# Patient Record
Sex: FEMALE | Race: WHITE | NOT HISPANIC OR LATINO | URBAN - METROPOLITAN AREA
[De-identification: names, ages, dates, MRNs, and addresses within clinical notes are randomized per-mention and may not be internally consistent; named-entity substitution may affect disease eponyms.]

---

## 2023-08-20 PROBLEM — R53.83 OTHER FATIGUE: Status: ACTIVE | Noted: 2023-08-20

## 2023-08-20 PROBLEM — G40.909 SEIZURE DISORDER (MULTI): Status: ACTIVE | Noted: 2023-08-20

## 2023-08-20 PROBLEM — M76.60 ACHILLES TENDINITIS: Status: ACTIVE | Noted: 2023-08-20

## 2023-08-20 PROBLEM — N95.0 POSTMENOPAUSAL BLEEDING: Status: ACTIVE | Noted: 2023-08-20

## 2023-08-20 PROBLEM — E78.00 HYPERCHOLESTEROLEMIA: Status: ACTIVE | Noted: 2023-08-20

## 2023-08-20 PROBLEM — R74.8 ELEVATED LIVER ENZYMES: Status: ACTIVE | Noted: 2023-08-20

## 2023-08-20 PROBLEM — E11.9 DM (DIABETES MELLITUS) (MULTI): Status: ACTIVE | Noted: 2023-08-20

## 2023-08-20 PROBLEM — E55.9 VITAMIN D DEFICIENCY: Status: ACTIVE | Noted: 2023-08-20

## 2023-08-20 PROBLEM — I10 ESSENTIAL HYPERTENSION: Status: ACTIVE | Noted: 2023-08-20

## 2023-08-20 RX ORDER — IBUPROFEN 600 MG/1
1 TABLET ORAL
COMMUNITY
Start: 2013-10-10 | End: 2023-10-03

## 2023-08-20 RX ORDER — IBUPROFEN 400 MG/1
1 TABLET ORAL
COMMUNITY

## 2023-08-20 RX ORDER — LANCETS 26 GAUGE
EACH MISCELLANEOUS 3 TIMES DAILY
COMMUNITY
Start: 2020-12-03 | End: 2023-10-05 | Stop reason: SDUPTHER

## 2023-08-20 RX ORDER — ETHOSUXIMIDE 250 MG/1
2 CAPSULE ORAL NIGHTLY
COMMUNITY

## 2023-08-20 RX ORDER — ALBUTEROL SULFATE 90 UG/1
1 AEROSOL, METERED RESPIRATORY (INHALATION) EVERY 4 HOURS PRN
COMMUNITY
End: 2023-10-23 | Stop reason: SDUPTHER

## 2023-08-20 RX ORDER — GLIMEPIRIDE 1 MG/1
1 TABLET ORAL
COMMUNITY
Start: 2018-10-09 | End: 2024-05-28 | Stop reason: WASHOUT

## 2023-08-20 RX ORDER — LOSARTAN POTASSIUM 50 MG/1
1.5 TABLET ORAL DAILY
COMMUNITY
Start: 2012-08-14 | End: 2024-05-22

## 2023-08-20 RX ORDER — METFORMIN HYDROCHLORIDE 500 MG/1
1 TABLET, EXTENDED RELEASE ORAL 2 TIMES DAILY
COMMUNITY
Start: 2012-10-21

## 2023-08-20 RX ORDER — LOVASTATIN 20 MG/1
1 TABLET ORAL DAILY
COMMUNITY
Start: 2012-08-14 | End: 2023-10-03

## 2023-08-20 RX ORDER — LOVASTATIN 10 MG/1
1 TABLET ORAL DAILY
COMMUNITY
End: 2023-10-15 | Stop reason: SDUPTHER

## 2023-09-30 DIAGNOSIS — M54.50 LOW BACK PAIN, UNSPECIFIED: ICD-10-CM

## 2023-09-30 DIAGNOSIS — E11.9 TYPE 2 DIABETES MELLITUS WITHOUT COMPLICATIONS (MULTI): ICD-10-CM

## 2023-10-03 RX ORDER — IBUPROFEN 600 MG/1
TABLET ORAL
Qty: 90 TABLET | Refills: 0 | Status: SHIPPED | OUTPATIENT
Start: 2023-10-03 | End: 2023-12-04

## 2023-10-03 RX ORDER — LOVASTATIN 20 MG/1
20 TABLET ORAL DAILY
Qty: 90 TABLET | Refills: 0 | Status: SHIPPED | OUTPATIENT
Start: 2023-10-03 | End: 2024-02-21

## 2023-10-05 ENCOUNTER — OFFICE VISIT (OUTPATIENT)
Dept: PRIMARY CARE | Facility: CLINIC | Age: 63
End: 2023-10-05
Payer: COMMERCIAL

## 2023-10-05 VITALS
OXYGEN SATURATION: 97 % | WEIGHT: 186 LBS | BODY MASS INDEX: 32.96 KG/M2 | DIASTOLIC BLOOD PRESSURE: 82 MMHG | TEMPERATURE: 98 F | HEIGHT: 63 IN | SYSTOLIC BLOOD PRESSURE: 134 MMHG | HEART RATE: 87 BPM

## 2023-10-05 DIAGNOSIS — Z13.6 SCREENING FOR CARDIOVASCULAR CONDITION: ICD-10-CM

## 2023-10-05 DIAGNOSIS — Z53.20 COLON CANCER SCREENING DECLINED: ICD-10-CM

## 2023-10-05 DIAGNOSIS — E55.9 VITAMIN D DEFICIENCY: ICD-10-CM

## 2023-10-05 DIAGNOSIS — Z28.21 IMMUNIZATION DECLINED: ICD-10-CM

## 2023-10-05 DIAGNOSIS — G40.909 SEIZURE DISORDER (MULTI): ICD-10-CM

## 2023-10-05 DIAGNOSIS — E11.65 TYPE 2 DIABETES MELLITUS WITH HYPERGLYCEMIA, WITHOUT LONG-TERM CURRENT USE OF INSULIN (MULTI): ICD-10-CM

## 2023-10-05 DIAGNOSIS — Z00.00 ANNUAL PHYSICAL EXAM: Primary | ICD-10-CM

## 2023-10-05 PROCEDURE — 3075F SYST BP GE 130 - 139MM HG: CPT | Performed by: STUDENT IN AN ORGANIZED HEALTH CARE EDUCATION/TRAINING PROGRAM

## 2023-10-05 PROCEDURE — 3079F DIAST BP 80-89 MM HG: CPT | Performed by: STUDENT IN AN ORGANIZED HEALTH CARE EDUCATION/TRAINING PROGRAM

## 2023-10-05 PROCEDURE — 1036F TOBACCO NON-USER: CPT | Performed by: STUDENT IN AN ORGANIZED HEALTH CARE EDUCATION/TRAINING PROGRAM

## 2023-10-05 PROCEDURE — 4010F ACE/ARB THERAPY RXD/TAKEN: CPT | Performed by: STUDENT IN AN ORGANIZED HEALTH CARE EDUCATION/TRAINING PROGRAM

## 2023-10-05 PROCEDURE — 99396 PREV VISIT EST AGE 40-64: CPT | Performed by: STUDENT IN AN ORGANIZED HEALTH CARE EDUCATION/TRAINING PROGRAM

## 2023-10-05 RX ORDER — BLOOD-GLUCOSE SENSOR
EACH MISCELLANEOUS
COMMUNITY

## 2023-10-05 RX ORDER — LOVASTATIN 10 MG/1
10 TABLET ORAL NIGHTLY
COMMUNITY
End: 2023-10-15 | Stop reason: SDUPTHER

## 2023-10-05 RX ORDER — INSULIN PUMP SYRINGE, 3 ML
1 EACH MISCELLANEOUS AS NEEDED
COMMUNITY
End: 2023-10-05 | Stop reason: SDUPTHER

## 2023-10-05 ASSESSMENT — PATIENT HEALTH QUESTIONNAIRE - PHQ9
2. FEELING DOWN, DEPRESSED OR HOPELESS: NOT AT ALL
SUM OF ALL RESPONSES TO PHQ9 QUESTIONS 1 AND 2: 0
1. LITTLE INTEREST OR PLEASURE IN DOING THINGS: NOT AT ALL

## 2023-10-05 ASSESSMENT — PAIN SCALES - GENERAL: PAINLEVEL: 0-NO PAIN

## 2023-10-05 NOTE — PROGRESS NOTES
"Subjective   Shadia Orozco is a 63 y.o. female who presents for Annual Exam.    HPI:      Preventative Health Care:  No vaccinations on file  Mammogram:  ordered last visit, scheduled 11/3/2023 at [facility]  Colon Cancer Screening:  Declines  Pap:  Has GYN, prior 3 years    Review of systems is essentially negative for all systems except for any identified issues in HPI above.    Follows with endocrinology.  Not taking the glimiperide due to side effects:  dizziness/unsteadiness.  Seeing NP in December, physician in January.  Has Karis  Eye exam 1 month ago, has appointment    Labs done at [facility] in July    Ethosux level, appt with neurologist in December.        Objective     /82   Pulse 87   Temp 36.7 °C (98 °F)   Ht 1.6 m (5' 3\")   Wt 84.4 kg (186 lb)   SpO2 97%   BMI 32.95 kg/m²      DIABETIC FOOT EXAM:  DATE: 10/05/23  VISUAL EXAM PERFORMED OF FOOT?  yes  SENSORY TESTING OF FOOT PERFORMED:  yes  PEDAL PULSE TAKEN PERFORMED: 2+  RESULTS:  normal monofilament exam    DIABETIC EYE EXAM:  DATE: 10/5/2023 EXAMINATION OF RETINA  RESULTS: appt later today, no hx of DM retinopathy    PHYSICAL EXAM    GENERAL  Well-appearing, pleasant and cooperative.  No acute distress.    HEENT  HEAD:   Normocephalic.  Atraumatic.  EYES:  PERRLA.  No scleral icterus or conjunctival injection.  EARS:  Tympanic membranes visualized bilaterally without erythema, fluid, or bulging.  NECK:  No adenopathy.  No palpable thyroid enlargement or nodules.    THROAT:  Moist oropharynx without tonsillar enlargement or exudates.    LUNGS:    Clear to auscultation bilaterally.  No wheezes, rales, rhonchi.    CARDIAC:  Regular rate and rhythm.  Normal S1S2.  No murmurs/rubs/gallops.    ABDOMEN:  Soft, non-tender, non-distended.  No hepatosplenomegaly.  Normoactive bowel sounds.    MUSCULOSKELETAL:  No gross abnormalities.   No joint swelling or erythema,.  No spinal or paraspinal tenderness to palpation.    EXTREMITIES:  No LE edema or " cyanosis.      NEURO           Alert and oriented x3. No focal deficits.    PSYCH:          Affect appropriate.          Assessment/Plan   Problem List Items Addressed This Visit          Endocrine/Metabolic    DM (diabetes mellitus) (CMS/HCC)    Vitamin D deficiency    Relevant Orders    Vitamin D 25-Hydroxy,Total (for eval of Vitamin D levels) (Completed)       Neuro    Seizure disorder (CMS/HCC)    Relevant Orders    Ethosuximide (Completed)     Other Visit Diagnoses       Annual physical exam    -  Primary    Relevant Orders    Hepatitis C Antibody (Completed)    CBC (Completed)    Screening for cardiovascular condition        Colon cancer screening declined        Immunization declined                     Marci Berry MD

## 2023-10-05 NOTE — PATIENT INSTRUCTIONS
Thank you for coming to see me today.    Go to the lab for blood work, we will call you with all results.    Keep mammogram appointment as scheduled.    Follow-up with endocrinologist for your diabetes and medication concerns as discussed.    Follow-up with me in 6 months, sooner if needed.

## 2023-10-10 ENCOUNTER — LAB (OUTPATIENT)
Dept: LAB | Facility: LAB | Age: 63
End: 2023-10-10
Payer: COMMERCIAL

## 2023-10-10 DIAGNOSIS — G40.909 SEIZURE DISORDER (MULTI): ICD-10-CM

## 2023-10-10 DIAGNOSIS — E55.9 VITAMIN D DEFICIENCY: ICD-10-CM

## 2023-10-10 DIAGNOSIS — Z00.00 ANNUAL PHYSICAL EXAM: ICD-10-CM

## 2023-10-10 LAB
25(OH)D3 SERPL-MCNC: 33 NG/ML (ref 31–100)
ERYTHROCYTE [DISTWIDTH] IN BLOOD BY AUTOMATED COUNT: 12.5 % (ref 11.5–14.5)
HCT VFR BLD AUTO: 38.9 % (ref 36–46)
HCV AB SER QL: NONREACTIVE
HGB BLD-MCNC: 12.9 G/DL (ref 12–16)
MCH RBC QN AUTO: 30 PG (ref 26–34)
MCHC RBC AUTO-ENTMCNC: 33.2 G/DL (ref 32–36)
MCV RBC AUTO: 91 FL (ref 80–100)
NRBC BLD-RTO: 0 /100 WBCS (ref 0–0)
PLATELET # BLD AUTO: 241 X10*3/UL (ref 150–450)
PMV BLD AUTO: 11.6 FL (ref 7.5–11.5)
RBC # BLD AUTO: 4.3 X10*6/UL (ref 4–5.2)
WBC # BLD AUTO: 7.1 X10*3/UL (ref 4.4–11.3)

## 2023-10-10 PROCEDURE — 80168 ASSAY OF ETHOSUXIMIDE: CPT

## 2023-10-10 PROCEDURE — 85027 COMPLETE CBC AUTOMATED: CPT

## 2023-10-10 PROCEDURE — 82306 VITAMIN D 25 HYDROXY: CPT

## 2023-10-10 PROCEDURE — 36415 COLL VENOUS BLD VENIPUNCTURE: CPT

## 2023-10-10 PROCEDURE — 86803 HEPATITIS C AB TEST: CPT

## 2023-10-12 LAB — ETHOSUXIMIDE SERPL-MCNC: 40 UG/ML (ref 40–100)

## 2023-10-23 DIAGNOSIS — Z76.0 MEDICATION REFILL: ICD-10-CM

## 2023-10-23 RX ORDER — ALBUTEROL SULFATE 90 UG/1
1 AEROSOL, METERED RESPIRATORY (INHALATION) EVERY 4 HOURS PRN
Qty: 18 G | Refills: 0 | Status: SHIPPED | OUTPATIENT
Start: 2023-10-23 | End: 2023-11-21

## 2023-11-03 ENCOUNTER — HOSPITAL ENCOUNTER (OUTPATIENT)
Dept: RADIOLOGY | Facility: HOSPITAL | Age: 63
Discharge: HOME | End: 2023-11-03
Payer: COMMERCIAL

## 2023-11-03 VITALS — WEIGHT: 179 LBS | HEIGHT: 63 IN | BODY MASS INDEX: 31.71 KG/M2

## 2023-11-03 DIAGNOSIS — Z12.31 ENCOUNTER FOR SCREENING MAMMOGRAM FOR MALIGNANT NEOPLASM OF BREAST: ICD-10-CM

## 2023-11-03 PROCEDURE — 77067 SCR MAMMO BI INCL CAD: CPT

## 2023-11-07 ENCOUNTER — HOSPITAL ENCOUNTER (OUTPATIENT)
Dept: RADIOLOGY | Facility: HOSPITAL | Age: 63
Discharge: HOME | End: 2023-11-07
Payer: COMMERCIAL

## 2023-11-07 VITALS — HEIGHT: 63 IN | WEIGHT: 179 LBS | BODY MASS INDEX: 31.71 KG/M2

## 2023-11-07 DIAGNOSIS — R92.8 OTHER ABNORMAL AND INCONCLUSIVE FINDINGS ON DIAGNOSTIC IMAGING OF BREAST: ICD-10-CM

## 2023-11-07 PROCEDURE — 77065 DX MAMMO INCL CAD UNI: CPT | Mod: RT

## 2023-11-07 PROCEDURE — 76642 ULTRASOUND BREAST LIMITED: CPT | Mod: RT

## 2023-11-21 DIAGNOSIS — Z76.0 MEDICATION REFILL: ICD-10-CM

## 2023-11-21 RX ORDER — ALBUTEROL SULFATE 90 UG/1
1 AEROSOL, METERED RESPIRATORY (INHALATION) EVERY 4 HOURS PRN
Qty: 18 G | Refills: 0 | Status: SHIPPED | OUTPATIENT
Start: 2023-11-21 | End: 2024-03-28

## 2023-12-01 DIAGNOSIS — M54.50 LOW BACK PAIN, UNSPECIFIED: ICD-10-CM

## 2023-12-04 RX ORDER — IBUPROFEN 600 MG/1
TABLET ORAL
Qty: 90 TABLET | Refills: 0 | Status: SHIPPED | OUTPATIENT
Start: 2023-12-04 | End: 2024-02-23

## 2023-12-08 ENCOUNTER — TELEPHONE (OUTPATIENT)
Dept: PRIMARY CARE | Facility: CLINIC | Age: 63
End: 2023-12-08
Payer: COMMERCIAL

## 2023-12-08 DIAGNOSIS — Z12.31 ENCOUNTER FOR SCREENING MAMMOGRAM FOR MALIGNANT NEOPLASM OF BREAST: ICD-10-CM

## 2023-12-08 DIAGNOSIS — R92.8 ABNORMAL MAMMOGRAM OF RIGHT BREAST: Primary | ICD-10-CM

## 2023-12-08 DIAGNOSIS — R92.8 ABNORMAL ULTRASOUND OF BREAST: ICD-10-CM

## 2023-12-10 NOTE — TELEPHONE ENCOUNTER
Mammogram and US orders entered.  Mammogram and US last performed on 11/6/2023 and recommended six month follow up, so she is due for these studies in May, not March.

## 2023-12-11 NOTE — TELEPHONE ENCOUNTER
Call placed to patient, no answer. Left message advising the orders have been placed to repeat in May and to call back with any questions.

## 2024-02-20 DIAGNOSIS — E11.9 TYPE 2 DIABETES MELLITUS WITHOUT COMPLICATIONS (MULTI): ICD-10-CM

## 2024-02-21 RX ORDER — LOVASTATIN 10 MG/1
10 TABLET ORAL
Qty: 90 TABLET | Refills: 2 | Status: SHIPPED | OUTPATIENT
Start: 2024-02-21

## 2024-02-21 RX ORDER — LOVASTATIN 20 MG/1
20 TABLET ORAL DAILY
Qty: 90 TABLET | Refills: 0 | Status: SHIPPED | OUTPATIENT
Start: 2024-02-21 | End: 2024-05-20

## 2024-02-23 DIAGNOSIS — M54.50 LOW BACK PAIN, UNSPECIFIED: ICD-10-CM

## 2024-02-23 RX ORDER — IBUPROFEN 600 MG/1
TABLET ORAL
Qty: 90 TABLET | Refills: 0 | Status: SHIPPED | OUTPATIENT
Start: 2024-02-23 | End: 2024-03-25

## 2024-03-23 DIAGNOSIS — M54.50 LOW BACK PAIN, UNSPECIFIED: ICD-10-CM

## 2024-03-25 DIAGNOSIS — Z76.0 MEDICATION REFILL: ICD-10-CM

## 2024-03-25 RX ORDER — IBUPROFEN 600 MG/1
TABLET ORAL
Qty: 90 TABLET | Refills: 0 | Status: SHIPPED | OUTPATIENT
Start: 2024-03-25 | End: 2024-04-22

## 2024-03-28 RX ORDER — ALBUTEROL SULFATE 90 UG/1
1 AEROSOL, METERED RESPIRATORY (INHALATION) EVERY 4 HOURS PRN
Qty: 8.5 G | Refills: 3 | Status: SHIPPED | OUTPATIENT
Start: 2024-03-28

## 2024-04-21 DIAGNOSIS — M54.50 LOW BACK PAIN, UNSPECIFIED: ICD-10-CM

## 2024-04-22 RX ORDER — IBUPROFEN 600 MG/1
TABLET ORAL
Qty: 90 TABLET | Refills: 0 | Status: SHIPPED | OUTPATIENT
Start: 2024-04-22 | End: 2024-05-24

## 2024-05-02 ENCOUNTER — HOSPITAL ENCOUNTER (OUTPATIENT)
Dept: RADIOLOGY | Facility: HOSPITAL | Age: 64
Discharge: HOME | End: 2024-05-02
Payer: COMMERCIAL

## 2024-05-02 VITALS — HEIGHT: 63 IN | WEIGHT: 180 LBS | BODY MASS INDEX: 31.89 KG/M2

## 2024-05-02 DIAGNOSIS — R92.8 ABNORMAL ULTRASOUND OF BREAST: ICD-10-CM

## 2024-05-02 DIAGNOSIS — Z12.31 ENCOUNTER FOR SCREENING MAMMOGRAM FOR MALIGNANT NEOPLASM OF BREAST: ICD-10-CM

## 2024-05-02 DIAGNOSIS — R92.8 ABNORMAL MAMMOGRAM OF RIGHT BREAST: ICD-10-CM

## 2024-05-02 PROCEDURE — 77061 BREAST TOMOSYNTHESIS UNI: CPT | Mod: RIGHT SIDE | Performed by: RADIOLOGY

## 2024-05-02 PROCEDURE — 76642 ULTRASOUND BREAST LIMITED: CPT | Mod: RIGHT SIDE | Performed by: RADIOLOGY

## 2024-05-02 PROCEDURE — 76642 ULTRASOUND BREAST LIMITED: CPT | Mod: RT

## 2024-05-02 PROCEDURE — 77065 DX MAMMO INCL CAD UNI: CPT | Mod: RIGHT SIDE | Performed by: RADIOLOGY

## 2024-05-02 PROCEDURE — 77061 BREAST TOMOSYNTHESIS UNI: CPT | Mod: RT

## 2024-05-19 DIAGNOSIS — E11.9 TYPE 2 DIABETES MELLITUS WITHOUT COMPLICATIONS (MULTI): ICD-10-CM

## 2024-05-20 RX ORDER — LOVASTATIN 20 MG/1
20 TABLET ORAL DAILY
Qty: 90 TABLET | Refills: 0 | Status: SHIPPED | OUTPATIENT
Start: 2024-05-20

## 2024-05-22 DIAGNOSIS — E11.9 TYPE 2 DIABETES MELLITUS WITHOUT COMPLICATIONS (MULTI): ICD-10-CM

## 2024-05-22 RX ORDER — LOSARTAN POTASSIUM 50 MG/1
75 TABLET ORAL DAILY
Qty: 135 TABLET | Refills: 0 | Status: SHIPPED | OUTPATIENT
Start: 2024-05-22

## 2024-05-22 NOTE — TELEPHONE ENCOUNTER
Recent Visits  Date Type Provider Dept   10/05/23 Office Visit Marci Berry MD [place] [id number]   Showing recent visits within past 365 days and meeting all other requirements  Future Appointments  No visits were found meeting these conditions.  Showing future appointments within next 90 days and meeting all other requirements

## 2024-05-23 DIAGNOSIS — M54.50 LOW BACK PAIN, UNSPECIFIED: ICD-10-CM

## 2024-05-23 NOTE — TELEPHONE ENCOUNTER
Recent Visits  Date Type Provider Dept   10/05/23 Office Visit Marci Berry MD [place] [id number]   Showing recent visits within past 365 days and meeting all other requirements  Future Appointments  No visits were found meeting these conditions.  Showing future appointments within next 90 days and meeting all other requirements

## 2024-05-24 RX ORDER — IBUPROFEN 600 MG/1
TABLET ORAL
Qty: 90 TABLET | Refills: 0 | Status: SHIPPED | OUTPATIENT
Start: 2024-05-24

## 2024-05-28 ENCOUNTER — HOSPITAL ENCOUNTER (OUTPATIENT)
Dept: RADIOLOGY | Facility: CLINIC | Age: 64
Discharge: HOME | End: 2024-05-28
Payer: COMMERCIAL

## 2024-05-28 ENCOUNTER — TELEMEDICINE (OUTPATIENT)
Dept: PRIMARY CARE | Facility: CLINIC | Age: 64
End: 2024-05-28
Payer: COMMERCIAL

## 2024-05-28 ENCOUNTER — TELEPHONE (OUTPATIENT)
Dept: PRIMARY CARE | Facility: CLINIC | Age: 64
End: 2024-05-28

## 2024-05-28 VITALS — SYSTOLIC BLOOD PRESSURE: 119 MMHG | DIASTOLIC BLOOD PRESSURE: 69 MMHG | TEMPERATURE: 97.7 F

## 2024-05-28 DIAGNOSIS — R05.1 ACUTE COUGH: ICD-10-CM

## 2024-05-28 DIAGNOSIS — R05.1 ACUTE COUGH: Primary | ICD-10-CM

## 2024-05-28 DIAGNOSIS — Z91.09 ENVIRONMENTAL ALLERGIES: ICD-10-CM

## 2024-05-28 PROCEDURE — 3074F SYST BP LT 130 MM HG: CPT | Performed by: STUDENT IN AN ORGANIZED HEALTH CARE EDUCATION/TRAINING PROGRAM

## 2024-05-28 PROCEDURE — 71046 X-RAY EXAM CHEST 2 VIEWS: CPT | Performed by: RADIOLOGY

## 2024-05-28 PROCEDURE — 71046 X-RAY EXAM CHEST 2 VIEWS: CPT

## 2024-05-28 PROCEDURE — 99442 PR PHYS/QHP TELEPHONE EVALUATION 11-20 MIN: CPT | Performed by: STUDENT IN AN ORGANIZED HEALTH CARE EDUCATION/TRAINING PROGRAM

## 2024-05-28 PROCEDURE — 4010F ACE/ARB THERAPY RXD/TAKEN: CPT | Performed by: STUDENT IN AN ORGANIZED HEALTH CARE EDUCATION/TRAINING PROGRAM

## 2024-05-28 PROCEDURE — 3078F DIAST BP <80 MM HG: CPT | Performed by: STUDENT IN AN ORGANIZED HEALTH CARE EDUCATION/TRAINING PROGRAM

## 2024-05-28 PROCEDURE — 1036F TOBACCO NON-USER: CPT | Performed by: STUDENT IN AN ORGANIZED HEALTH CARE EDUCATION/TRAINING PROGRAM

## 2024-05-28 RX ORDER — BENZONATATE 100 MG/1
100 CAPSULE ORAL 3 TIMES DAILY PRN
Qty: 42 CAPSULE | Refills: 0 | Status: SHIPPED | OUTPATIENT
Start: 2024-05-28 | End: 2024-06-27

## 2024-05-28 RX ORDER — CETIRIZINE HYDROCHLORIDE 10 MG/1
10 TABLET ORAL DAILY
Qty: 30 TABLET | Refills: 1 | Status: SHIPPED | OUTPATIENT
Start: 2024-05-28 | End: 2024-08-26

## 2024-05-28 RX ORDER — AMOXICILLIN AND CLAVULANATE POTASSIUM 875; 125 MG/1; MG/1
875 TABLET, FILM COATED ORAL 2 TIMES DAILY
Qty: 20 TABLET | Refills: 0 | Status: SHIPPED | OUTPATIENT
Start: 2024-05-28 | End: 2024-06-07

## 2024-05-28 NOTE — TELEPHONE ENCOUNTER
Xray results are not yet available.  We will call her with results and futher advice once results are in.

## 2024-05-28 NOTE — TELEPHONE ENCOUNTER
Pt lvm stating she was seen in the office today and had a chest xray. Pt is requesting the results stating she would like to know if she has pneumonia if so she will need to be off from work.

## 2024-05-28 NOTE — PROGRESS NOTES
Outpatient Visit Note    Chief Complaint   Patient presents with    sick       With patient's permission, this is a Telemedicine visit with AUDIO ONLY. The provider and patient participated in this telemedicine encounter.    HPI:  Shadia Orozco is a 64 y.o. female here  for persistent cough sy    One week hx of productive cough with yellow sputum.  Sounds like rice krispy treats. Seen 2024 ago for sx at  and prescribed doxycyline, last dose today without significant symptom improvement     Coughing to the point of vomiting.  No SOB at rest, no chest pain.  No xray done during UC visit          Current Medications  Current Outpatient Medications   Medication Instructions    albuterol 90 mcg/actuation inhaler 1 puff, inhalation, Every 4 hours PRN    blood-glucose sensor (FreeStyle Karis 3 Sensor) device miscellaneous    calcium polycarbophiL (FiberCon) 625 mg tablet 2 tablets, oral, Daily PRN    CHOLECALCIFEROL, VITAMIN D3, ORAL 1 capsule, oral, Daily    ethosuximide (Zarontin) 250 mg capsule 2 capsules, oral, Nightly    glimepiride (Amaryl) 1 mg tablet 1 tablet, oral, With breakfast and dinner    ibuprofen 400 mg tablet 1 tablet, oral, 3 times daily (morning, midday, late afternoon), Or milk    ibuprofen 600 mg tablet TAKE 1 TABLET BY MOUTH THREE TIMES A DAY WITH FOOD OR MILK AS NEEDED FOR 30 DAYS    loratadine (CLARITIN ORAL) 1 tablet, oral, Daily    losartan (COZAAR) 75 mg, oral, Daily    lovastatin (MEVACOR) 10 mg, oral, Daily with evening meal    lovastatin (MEVACOR) 20 mg, oral, Daily, take with food    metFORMIN XR (Glucophage-XR) 500 mg 24 hr tablet 1 tablet, oral, 2 times daily        Allergies  Allergies   Allergen Reactions    Aspirin Other    Fentanyl Other    Nateglinide Other    Phenobarbital Unknown        Past Medical History:   Diagnosis Date    Unspecified convulsions (Multi)     Seizure      Past Surgical History:   Procedure Laterality Date    BREAST BIOPSY       SECTION,  CLASSIC  04/15/2013     Section    OTHER SURGICAL HISTORY  04/15/2013    Correction Of Surgically Induced Astigmatism     Family History   Problem Relation Name Age of Onset    Diabetes Mother      Arthritis Mother      Tremor Mother      Seizures Mother      Stroke Mother      Other (Other) Mother          Aphasia    Heart disease Father          Triple by-pass    Hypertension Father      Ulcerative colitis Father      Skin cancer Father      Heart disease Sister      No Known Problems Daughter      No Known Problems Daughter      No Known Problems Son      Stroke Maternal Grandmother      Esophageal cancer Maternal Grandfather      Liver cancer Paternal Grandfather      Breast cancer Other      Hypertension Sibling      Asthma Sibling      Anxiety disorder Sibling      Breast cancer Cousin      Breast cancer Cousin       Social History     Tobacco Use    Smoking status: Never    Smokeless tobacco: Never   Vaping Use    Vaping status: Never Used   Substance Use Topics    Alcohol use: Never    Drug use: Never     Tobacco Use: Low Risk  (2024)    Received from [facility]    Patient History     Smoking Tobacco Use: Never     Smokeless Tobacco Use: Never     Passive Exposure: Not on file        ROS  All pertinent positive symptoms are included in the history of present illness.  All other systems have been reviewed and are negative and noncontributory to this patient's current ailments.    VITAL SIGNS  Vitals:    24 0926   BP: 119/69   Temp: 36.5 °C (97.7 °F)     There were no vitals filed for this visit.   There is no height or weight on file to calculate BMI.   Patient is unable to proivide    PHYSICAL EXAM  GENERAL :  Alert and oriented x 3, Pleasant and cooperative, No acute distress.   LUNGS:  No conversational dyspnea or cough during encounter.   PSYCH:  appropriate mood and affect, no difficulty with speech.   Telemedicine visit, no other exam component done.      Assessment/Plan    Problem List Items Addressed This Visit    None  Visit Diagnoses         Codes    Acute cough    -  Primary R05.1    Relevant Medications    amoxicillin-pot clavulanate (Augmentin) 875-125 mg tablet    benzonatate (Tessalon) 100 mg capsule    Other Relevant Orders    XR chest 2 views    Environmental allergies     Z91.09    Relevant Medications    cetirizine (ZyrTEC) 10 mg tablet          Marci Berry MD   05/28/24   9:37 AM

## 2024-05-28 NOTE — PROGRESS NOTES
Crackling sound in chest when laying down, coughing yellow, been going on for 7 days, congested. Pt completed doxycycline this morning

## 2024-06-19 DIAGNOSIS — Z91.09 ENVIRONMENTAL ALLERGIES: ICD-10-CM

## 2024-06-19 RX ORDER — CETIRIZINE HYDROCHLORIDE 10 MG/1
10 TABLET ORAL DAILY
Qty: 90 TABLET | Refills: 1 | Status: SHIPPED | OUTPATIENT
Start: 2024-06-19

## 2024-09-14 DIAGNOSIS — E11.9 TYPE 2 DIABETES MELLITUS WITHOUT COMPLICATIONS (MULTI): ICD-10-CM

## 2024-09-14 DIAGNOSIS — Z76.0 MEDICATION REFILL: ICD-10-CM

## 2024-09-16 RX ORDER — LOSARTAN POTASSIUM 50 MG/1
75 TABLET ORAL DAILY
Qty: 135 TABLET | Refills: 0 | Status: SHIPPED | OUTPATIENT
Start: 2024-09-16

## 2024-10-02 DIAGNOSIS — E11.65 TYPE 2 DIABETES MELLITUS WITH HYPERGLYCEMIA, WITHOUT LONG-TERM CURRENT USE OF INSULIN: ICD-10-CM

## 2024-10-02 RX ORDER — METFORMIN HYDROCHLORIDE 500 MG/1
500 TABLET, EXTENDED RELEASE ORAL 4 TIMES DAILY
Qty: 360 TABLET | Refills: 2 | Status: SHIPPED | OUTPATIENT
Start: 2024-10-02

## 2024-11-07 DIAGNOSIS — E11.9 TYPE 2 DIABETES MELLITUS WITHOUT COMPLICATIONS (MULTI): ICD-10-CM

## 2024-11-08 RX ORDER — LOVASTATIN 10 MG/1
10 TABLET ORAL
Qty: 90 TABLET | Refills: 1 | Status: SHIPPED | OUTPATIENT
Start: 2024-11-08

## 2024-11-12 DIAGNOSIS — E11.9 TYPE 2 DIABETES MELLITUS WITHOUT COMPLICATIONS (MULTI): ICD-10-CM

## 2024-11-12 RX ORDER — LOVASTATIN 20 MG/1
20 TABLET ORAL DAILY
Qty: 90 TABLET | Refills: 0 | Status: SHIPPED | OUTPATIENT
Start: 2024-11-12

## 2024-11-22 DIAGNOSIS — Z76.0 MEDICATION REFILL: ICD-10-CM

## 2024-11-22 DIAGNOSIS — E11.9 TYPE 2 DIABETES MELLITUS WITHOUT COMPLICATIONS (MULTI): ICD-10-CM

## 2024-11-22 DIAGNOSIS — I10 ESSENTIAL HYPERTENSION: ICD-10-CM

## 2024-11-25 RX ORDER — LOVASTATIN 20 MG/1
20 TABLET ORAL DAILY
Qty: 90 TABLET | Refills: 0 | Status: SHIPPED | OUTPATIENT
Start: 2024-11-25

## 2024-11-25 RX ORDER — LOSARTAN POTASSIUM 50 MG/1
75 TABLET ORAL DAILY
Qty: 135 TABLET | Refills: 0 | Status: SHIPPED | OUTPATIENT
Start: 2024-11-25

## 2024-11-29 ENCOUNTER — APPOINTMENT (OUTPATIENT)
Dept: PRIMARY CARE | Facility: CLINIC | Age: 64
End: 2024-11-29
Payer: COMMERCIAL

## 2025-02-20 DIAGNOSIS — I10 ESSENTIAL HYPERTENSION: ICD-10-CM

## 2025-02-20 RX ORDER — LOSARTAN POTASSIUM 50 MG/1
75 TABLET ORAL DAILY
Qty: 135 TABLET | Refills: 0 | Status: SHIPPED | OUTPATIENT
Start: 2025-02-20

## 2025-03-18 ENCOUNTER — OFFICE VISIT (OUTPATIENT)
Dept: PRIMARY CARE | Facility: CLINIC | Age: 65
End: 2025-03-18
Payer: COMMERCIAL

## 2025-03-18 ENCOUNTER — APPOINTMENT (OUTPATIENT)
Dept: PRIMARY CARE | Facility: CLINIC | Age: 65
End: 2025-03-18
Payer: COMMERCIAL

## 2025-03-18 VITALS
BODY MASS INDEX: 32.24 KG/M2 | TEMPERATURE: 97.3 F | DIASTOLIC BLOOD PRESSURE: 82 MMHG | SYSTOLIC BLOOD PRESSURE: 142 MMHG | WEIGHT: 182 LBS | OXYGEN SATURATION: 99 % | HEART RATE: 89 BPM

## 2025-03-18 DIAGNOSIS — Z53.20 COLON CANCER SCREENING DECLINED: ICD-10-CM

## 2025-03-18 DIAGNOSIS — Z28.21 IMMUNIZATION DECLINED: ICD-10-CM

## 2025-03-18 DIAGNOSIS — L23.0 CONTACT DERMATITIS DUE TO METALS, UNSPECIFIED CONTACT DERMATITIS TYPE: ICD-10-CM

## 2025-03-18 DIAGNOSIS — M54.42 MIDLINE LOW BACK PAIN WITH LEFT-SIDED SCIATICA, UNSPECIFIED CHRONICITY: ICD-10-CM

## 2025-03-18 DIAGNOSIS — Z13.6 SCREENING FOR CARDIOVASCULAR CONDITION: ICD-10-CM

## 2025-03-18 DIAGNOSIS — Z00.00 ANNUAL PHYSICAL EXAM: Primary | ICD-10-CM

## 2025-03-18 DIAGNOSIS — Z12.31 ENCOUNTER FOR SCREENING MAMMOGRAM FOR MALIGNANT NEOPLASM OF BREAST: ICD-10-CM

## 2025-03-18 DIAGNOSIS — E11.65 TYPE 2 DIABETES MELLITUS WITH HYPERGLYCEMIA, WITHOUT LONG-TERM CURRENT USE OF INSULIN: ICD-10-CM

## 2025-03-18 DIAGNOSIS — R06.2 WHEEZING: ICD-10-CM

## 2025-03-18 DIAGNOSIS — E55.9 VITAMIN D DEFICIENCY: ICD-10-CM

## 2025-03-18 DIAGNOSIS — G40.909 SEIZURE DISORDER (MULTI): ICD-10-CM

## 2025-03-18 DIAGNOSIS — Z53.20 PAP SMEAR OF CERVIX DECLINED: ICD-10-CM

## 2025-03-18 PROCEDURE — 3079F DIAST BP 80-89 MM HG: CPT | Performed by: STUDENT IN AN ORGANIZED HEALTH CARE EDUCATION/TRAINING PROGRAM

## 2025-03-18 PROCEDURE — 3077F SYST BP >= 140 MM HG: CPT | Performed by: STUDENT IN AN ORGANIZED HEALTH CARE EDUCATION/TRAINING PROGRAM

## 2025-03-18 PROCEDURE — 99396 PREV VISIT EST AGE 40-64: CPT | Performed by: STUDENT IN AN ORGANIZED HEALTH CARE EDUCATION/TRAINING PROGRAM

## 2025-03-18 PROCEDURE — 4010F ACE/ARB THERAPY RXD/TAKEN: CPT | Performed by: STUDENT IN AN ORGANIZED HEALTH CARE EDUCATION/TRAINING PROGRAM

## 2025-03-18 PROCEDURE — 99214 OFFICE O/P EST MOD 30 MIN: CPT | Performed by: STUDENT IN AN ORGANIZED HEALTH CARE EDUCATION/TRAINING PROGRAM

## 2025-03-18 RX ORDER — ALBUTEROL SULFATE 90 UG/1
1 INHALANT RESPIRATORY (INHALATION) EVERY 4 HOURS PRN
Qty: 8.5 G | Refills: 3 | Status: SHIPPED | OUTPATIENT
Start: 2025-03-18

## 2025-03-18 RX ORDER — HYDROCORTISONE 25 MG/G
OINTMENT TOPICAL 2 TIMES DAILY PRN
Qty: 30 G | Refills: 0 | Status: SHIPPED | OUTPATIENT
Start: 2025-03-18 | End: 2026-03-18

## 2025-03-18 RX ORDER — IBUPROFEN 600 MG/1
600 TABLET ORAL 3 TIMES DAILY PRN
Qty: 60 TABLET | Refills: 0 | Status: SHIPPED | OUTPATIENT
Start: 2025-03-18 | End: 2025-05-17

## 2025-03-18 RX ORDER — BLOOD-GLUCOSE SENSOR
EACH MISCELLANEOUS
Qty: 6 EACH | Refills: 1 | Status: SHIPPED | OUTPATIENT
Start: 2025-03-18

## 2025-03-18 ASSESSMENT — PATIENT HEALTH QUESTIONNAIRE - PHQ9
2. FEELING DOWN, DEPRESSED OR HOPELESS: NOT AT ALL
1. LITTLE INTEREST OR PLEASURE IN DOING THINGS: NOT AT ALL
SUM OF ALL RESPONSES TO PHQ9 QUESTIONS 1 AND 2: 0

## 2025-03-18 ASSESSMENT — PAIN SCALES - GENERAL: PAINLEVEL_OUTOF10: 7

## 2025-03-18 NOTE — PROGRESS NOTES
Subjective   Shadia Orozco is a 64 y.o. female who presents for Annual Exam.    HPI:      PREVENTATIVE HEALTH CARE:    Immunizations:    Declines all      There is no immunization history on file for this patient.    Screenings:  HCV:  negative 10/10/2023  Pap:  declines  Mammogram:  DUE  Colonoscopy:  declines    DM2:  Glycemic Meds:    Metformin 500 mg QID  Statin:  lovastatin 20 mg      Lab Results   Component Value Date    HGBA1C 8.6 (A) 10/02/2024     Saw dietician at [facility] told her to combine carb with protein which has helped.    Hypertension:  Managed with losartan 50 mg daily.  Patient reports compliance with all blood pressure medications as prescribed.  Denies symptoms of chest pain/pressure/palpitations.  Also without headache or vision changes.    Watch Irritation:  Irritation on wrist from watch band since 10/2024.  Had old tube of triamcinolone which helped initially.    Sciatica:  Using ibuprofen 600 mg with heating pad.      ROS:    Review of systems is essentially negative for all systems except for any identified issues in HPI above.    Objective     /82   Pulse 89   Temp 36.3 °C (97.3 °F)   Wt 82.6 kg (182 lb)   SpO2 99%   BMI 32.24 kg/m²      PHYSICAL EXAM    GENERAL  Well-appearing, pleasant and cooperative.  No acute distress.    HEENT  HEAD:   Normocephalic.  Atraumatic.  EYES:  PERRLA.  No scleral icterus or conjunctival injection.  EARS:  Tympanic membranes visualized bilaterally without erythema, fluid, or bulging.  NECK:  No adenopathy.  No palpable thyroid enlargement or nodules.    THROAT:  Moist oropharynx without tonsillar enlargement or exudates.    LUNGS:    Clear to auscultation bilaterally.  No wheezes, rales, rhonchi.    CARDIAC:  Regular rate and rhythm.  Normal S1S2.  No murmurs/rubs/gallops.    ABDOMEN:  Soft, non-tender, non-distended.  No hepatosplenomegaly.  Normoactive bowel sounds.    MUSCULOSKELETAL:  No gross abnormalities.   No joint swelling or erythema,.   No spinal or paraspinal tenderness to palpation.    EXTREMITIES:  Erythematous patchy dermatitis on L wrist.  No LE edema or cyanosis.      NEURO           Alert and oriented x3. No focal deficits.    PSYCH:          Affect appropriate.           Assessment/Plan   Problem List Items Addressed This Visit       DM (diabetes mellitus) (Multi)    Relevant Medications    blood-glucose sensor (FreeStyle Karis 3 Sensor) device    Other Relevant Orders    Referral to Endocrinology    Referral to Nutrition Services    Albumin-Creatinine Ratio, Urine Random    Hemoglobin A1c    Seizure disorder (Multi)    Relevant Orders    Ethosuximide    Vitamin D deficiency    Relevant Orders    Vitamin D 25-Hydroxy,Total (for eval of Vitamin D levels)     Other Visit Diagnoses       Annual physical exam    -  Primary    Relevant Orders    TSH with reflex to Free T4 if abnormal    Lipid Panel    CBC    Comprehensive Metabolic Panel    Screening for cardiovascular condition        Relevant Orders    Lipid Panel    Encounter for screening mammogram for malignant neoplasm of breast        Relevant Orders    BI mammo bilateral screening tomosynthesis    Contact dermatitis due to metals, unspecified contact dermatitis type        Relevant Medications    hydrocortisone 2.5 % ointment    Midline low back pain with left-sided sciatica, unspecified chronicity        Relevant Medications    ibuprofen 600 mg tablet    Colon cancer screening declined        Pap smear of cervix declined        Wheezing        Relevant Medications    albuterol 90 mcg/actuation inhaler    Immunization declined              Counseling:   Medication education:    Education: The patient is counseled regarding potential side effects of all new medications.    Understanding:  Patient expressed understanding.    Adherence:  No barriers to adherence identified.         Marci Berry MD

## 2025-03-18 NOTE — PROGRESS NOTES
Pt states her cortisol levels arise at work which causes blood sugar to arise.  She has an inflammation in her wrist from her watch band and uses desiden. The inflammation and itching has been since October. She has lower back and left leg pain due to sciatica she states her sciatica is a 7/10 at the moment. Sometimes her sciatica pain goes in her groin area. After work the pain arises to a 12/10. Needs rx on albuterol, wants to reorder freestyle,

## 2025-03-18 NOTE — PATIENT INSTRUCTIONS
Thank you for coming to see me today.    Follow-up with endocrinologist as scheduled next month, can further discuss cortisol concerns.    Go to the lab for fasting blood work and urine testing, we will call you with all results.    Mammogram order entered today, you will be due for this imaging on 5/3/2025 or after.    Lumbar spine x-ray ordered today for further evaluation of your low back pain and sciatica.  You do not need an appointment for this imaging.  -Ibuprofen 600 mg prescription sent to pharmacy for further management of this pain while waiting on x-ray results.    Endocrinology and nutrition referrals entered today so you can establish with providers in the  system.  Keep upcoming endocrinology appointment for now with [facility].    Hydrocortisone ointment sent to pharmacy for suspected contact dermatitis on your wrist.  Use twice daily as prescribed.  Follow-up if symptoms not improving.    Routine follow-up with me in 3 months, sooner if needed.

## 2025-03-20 ENCOUNTER — APPOINTMENT (OUTPATIENT)
Dept: PRIMARY CARE | Facility: CLINIC | Age: 65
End: 2025-03-20
Payer: COMMERCIAL

## 2025-03-21 ENCOUNTER — HOSPITAL ENCOUNTER (OUTPATIENT)
Dept: RADIOLOGY | Facility: HOSPITAL | Age: 65
Discharge: HOME | End: 2025-03-21
Payer: COMMERCIAL

## 2025-03-21 DIAGNOSIS — M54.42 MIDLINE LOW BACK PAIN WITH LEFT-SIDED SCIATICA, UNSPECIFIED CHRONICITY: ICD-10-CM

## 2025-03-21 PROCEDURE — 72110 X-RAY EXAM L-2 SPINE 4/>VWS: CPT

## 2025-04-02 LAB
25(OH)D3+25(OH)D2 SERPL-MCNC: 40 NG/ML (ref 30–100)
ALBUMIN SERPL-MCNC: 4.4 G/DL (ref 3.6–5.1)
ALBUMIN/CREAT UR: 47 MG/G CREAT
ALP SERPL-CCNC: 72 U/L (ref 37–153)
ALT SERPL-CCNC: 22 U/L (ref 6–29)
ANION GAP SERPL CALCULATED.4IONS-SCNC: 10 MMOL/L (CALC) (ref 7–17)
AST SERPL-CCNC: 18 U/L (ref 10–35)
BILIRUB SERPL-MCNC: 0.3 MG/DL (ref 0.2–1.2)
BUN SERPL-MCNC: 16 MG/DL (ref 7–25)
CALCIUM SERPL-MCNC: 10.1 MG/DL (ref 8.6–10.4)
CHLORIDE SERPL-SCNC: 101 MMOL/L (ref 98–110)
CHOLEST SERPL-MCNC: 178 MG/DL
CHOLEST/HDLC SERPL: 4.7 (CALC)
CO2 SERPL-SCNC: 28 MMOL/L (ref 20–32)
CREAT SERPL-MCNC: 0.77 MG/DL (ref 0.5–1.05)
CREAT UR-MCNC: 30 MG/DL (ref 20–275)
EGFRCR SERPLBLD CKD-EPI 2021: 87 ML/MIN/1.73M2
ERYTHROCYTE [DISTWIDTH] IN BLOOD BY AUTOMATED COUNT: 12.9 % (ref 11–15)
EST. AVERAGE GLUCOSE BLD GHB EST-MCNC: 232 MG/DL
EST. AVERAGE GLUCOSE BLD GHB EST-SCNC: 12.8 MMOL/L
GLUCOSE SERPL-MCNC: 181 MG/DL (ref 65–99)
HBA1C MFR BLD: 9.7 % OF TOTAL HGB
HCT VFR BLD AUTO: 41.1 % (ref 35–45)
HDLC SERPL-MCNC: 38 MG/DL
HGB BLD-MCNC: 13.3 G/DL (ref 11.7–15.5)
LDLC SERPL CALC-MCNC: 95 MG/DL (CALC)
MCH RBC QN AUTO: 30.4 PG (ref 27–33)
MCHC RBC AUTO-ENTMCNC: 32.4 G/DL (ref 32–36)
MCV RBC AUTO: 94.1 FL (ref 80–100)
MICROALBUMIN UR-MCNC: 1.4 MG/DL
NONHDLC SERPL-MCNC: 140 MG/DL (CALC)
PLATELET # BLD AUTO: 243 THOUSAND/UL (ref 140–400)
PMV BLD REES-ECKER: 11.4 FL (ref 7.5–12.5)
POTASSIUM SERPL-SCNC: 4.2 MMOL/L (ref 3.5–5.3)
PROT SERPL-MCNC: 7.3 G/DL (ref 6.1–8.1)
QUEST ETHOSUXIMIDE: 40 MG/L (ref 40–100)
RBC # BLD AUTO: 4.37 MILLION/UL (ref 3.8–5.1)
SODIUM SERPL-SCNC: 139 MMOL/L (ref 135–146)
TRIGL SERPL-MCNC: 341 MG/DL
TSH SERPL-ACNC: 2.93 MIU/L (ref 0.4–4.5)
WBC # BLD AUTO: 7.9 THOUSAND/UL (ref 3.8–10.8)

## 2025-04-04 DIAGNOSIS — I10 ESSENTIAL HYPERTENSION: ICD-10-CM

## 2025-04-04 DIAGNOSIS — M54.42 MIDLINE LOW BACK PAIN WITH LEFT-SIDED SCIATICA, UNSPECIFIED CHRONICITY: ICD-10-CM

## 2025-04-06 ENCOUNTER — PATIENT MESSAGE (OUTPATIENT)
Dept: PRIMARY CARE | Facility: CLINIC | Age: 65
End: 2025-04-06
Payer: COMMERCIAL

## 2025-04-06 DIAGNOSIS — I10 ESSENTIAL HYPERTENSION: ICD-10-CM

## 2025-04-07 RX ORDER — IBUPROFEN 600 MG/1
600 TABLET ORAL 3 TIMES DAILY PRN
Qty: 60 TABLET | Refills: 0 | Status: SHIPPED | OUTPATIENT
Start: 2025-04-07 | End: 2025-06-06

## 2025-04-07 RX ORDER — LOSARTAN POTASSIUM 50 MG/1
75 TABLET ORAL DAILY
Qty: 135 TABLET | Refills: 0 | Status: SHIPPED | OUTPATIENT
Start: 2025-04-07 | End: 2025-04-08 | Stop reason: SDUPTHER

## 2025-04-08 RX ORDER — LOSARTAN POTASSIUM 50 MG/1
75 TABLET ORAL DAILY
Qty: 135 TABLET | Refills: 1 | Status: SHIPPED | OUTPATIENT
Start: 2025-04-08

## 2025-05-02 ENCOUNTER — TELEPHONE (OUTPATIENT)
Dept: PRIMARY CARE | Facility: CLINIC | Age: 65
End: 2025-05-02
Payer: COMMERCIAL

## 2025-05-02 DIAGNOSIS — E11.65 TYPE 2 DIABETES MELLITUS WITH HYPERGLYCEMIA, WITHOUT LONG-TERM CURRENT USE OF INSULIN: Primary | ICD-10-CM

## 2025-05-02 RX ORDER — BLOOD-GLUCOSE SENSOR
EACH MISCELLANEOUS
Qty: 6 EACH | Refills: 1 | Status: SHIPPED | OUTPATIENT
Start: 2025-05-02

## 2025-05-02 NOTE — TELEPHONE ENCOUNTER
Pt was notified by the Karis company that they will no longer be continuing the Freestyle karis 3. Needs new rx for Karis 3 plus sensors sent to [facility] in [place].

## 2025-05-03 DIAGNOSIS — E11.9 TYPE 2 DIABETES MELLITUS WITHOUT COMPLICATIONS: ICD-10-CM

## 2025-05-04 DIAGNOSIS — E11.9 TYPE 2 DIABETES MELLITUS WITHOUT COMPLICATIONS: ICD-10-CM

## 2025-05-05 RX ORDER — LOVASTATIN 10 MG/1
10 TABLET ORAL
Qty: 90 TABLET | Refills: 1 | Status: SHIPPED | OUTPATIENT
Start: 2025-05-05

## 2025-05-05 RX ORDER — LOVASTATIN 20 MG/1
20 TABLET ORAL DAILY
Qty: 90 TABLET | Refills: 1 | Status: SHIPPED | OUTPATIENT
Start: 2025-05-05

## 2025-05-09 DIAGNOSIS — M54.42 MIDLINE LOW BACK PAIN WITH LEFT-SIDED SCIATICA, UNSPECIFIED CHRONICITY: ICD-10-CM

## 2025-05-12 RX ORDER — IBUPROFEN 600 MG/1
600 TABLET ORAL 3 TIMES DAILY PRN
Qty: 60 TABLET | Refills: 0 | Status: SHIPPED | OUTPATIENT
Start: 2025-05-12 | End: 2025-07-11

## 2025-05-17 ENCOUNTER — APPOINTMENT (OUTPATIENT)
Dept: RADIOLOGY | Facility: HOSPITAL | Age: 65
End: 2025-05-17
Payer: COMMERCIAL

## 2025-05-19 ENCOUNTER — TELEPHONE (OUTPATIENT)
Dept: PRIMARY CARE | Facility: CLINIC | Age: 65
End: 2025-05-19
Payer: COMMERCIAL

## 2025-05-19 DIAGNOSIS — E11.65 TYPE 2 DIABETES MELLITUS WITH HYPERGLYCEMIA, WITHOUT LONG-TERM CURRENT USE OF INSULIN: Primary | ICD-10-CM

## 2025-05-19 NOTE — TELEPHONE ENCOUNTER
Pt stating she need a prior auth for the teena 3 plus [phone] stating she has been going back and forth for the last couple week asking for a call once this has been taken care of     Stating this is at [facility] stating her teena 3 has changed to teena 3 plus stating the insurance has been contacting the office but has not received a prior auth request

## 2025-05-29 ENCOUNTER — TELEPHONE (OUTPATIENT)
Dept: PRIMARY CARE | Facility: CLINIC | Age: 65
End: 2025-05-29
Payer: COMMERCIAL

## 2025-05-29 NOTE — TELEPHONE ENCOUNTER
Patient is working with the clinical pharm team - she is working on getting her insurance again. She most likely will not have it until 7/1. She will call us if she needs anything else before then. Looks to be scheduled 6/4 with pharmacy.

## 2025-06-02 ENCOUNTER — TELEPHONE (OUTPATIENT)
Dept: PRIMARY CARE | Facility: CLINIC | Age: 65
End: 2025-06-02
Payer: COMMERCIAL

## 2025-06-02 DIAGNOSIS — E11.65 TYPE 2 DIABETES MELLITUS WITH HYPERGLYCEMIA, WITHOUT LONG-TERM CURRENT USE OF INSULIN: Primary | ICD-10-CM

## 2025-06-02 NOTE — TELEPHONE ENCOUNTER
Call placed to pt pt asking if physician can send over for the dexcom equipment stating she need a meter for her sugar. Stating she will reach out to her insurance for the teena stephen as to why the insurance is not approving but would like the dexcom sent over to see if they will approve it

## 2025-06-02 NOTE — TELEPHONE ENCOUNTER
Pt asking for  a call back stating she received a message in regards to her teena 3 plus wanting to know hy this was denied asking if an order from the physician could be placed asking for a call back

## 2025-06-02 NOTE — TELEPHONE ENCOUNTER
This was already denied and she was told to schedule with clinical pharmacy - she needs to do that PA will not work

## 2025-06-03 RX ORDER — BLOOD-GLUCOSE SENSOR
EACH MISCELLANEOUS
Qty: 9 EACH | Refills: 0 | Status: SHIPPED | OUTPATIENT
Start: 2025-06-03

## 2025-06-03 NOTE — TELEPHONE ENCOUNTER
PT calling back and was notified of Dexcom sent to pharmacy.     PT states she scheduled an appointment with the clinical pharmacist for June 12 to work on her prescriptions and wanted to let Dr. Berry know.

## 2025-06-04 ENCOUNTER — APPOINTMENT (OUTPATIENT)
Dept: PHARMACY | Facility: HOSPITAL | Age: 65
End: 2025-06-04
Payer: COMMERCIAL

## 2025-06-11 ENCOUNTER — APPOINTMENT (OUTPATIENT)
Dept: RADIOLOGY | Facility: HOSPITAL | Age: 65
End: 2025-06-11
Payer: COMMERCIAL

## 2025-06-11 DIAGNOSIS — E11.65 TYPE 2 DIABETES MELLITUS WITH HYPERGLYCEMIA, WITHOUT LONG-TERM CURRENT USE OF INSULIN: ICD-10-CM

## 2025-06-11 NOTE — TELEPHONE ENCOUNTER
Patient called since the Karis 3 was being denied by insurance she is in need of an order to be placed to [facility] on [address] for Karis 3 plus trial sensor and also an order for the Karis 3 sensor. Patient states she was given a voucher for assistance in getting this. Please advise.

## 2025-06-12 ENCOUNTER — APPOINTMENT (OUTPATIENT)
Dept: PHARMACY | Facility: HOSPITAL | Age: 65
End: 2025-06-12
Payer: COMMERCIAL

## 2025-06-12 DIAGNOSIS — E11.65 TYPE 2 DIABETES MELLITUS WITH HYPERGLYCEMIA, WITHOUT LONG-TERM CURRENT USE OF INSULIN: ICD-10-CM

## 2025-06-12 RX ORDER — ZINC GLUCONATE 50 MG
50 TABLET ORAL DAILY
COMMUNITY

## 2025-06-12 NOTE — PROGRESS NOTES
MEDICATION MANAGEMENT    Shadia Orozco is a 65 y.o. female who was referred by Marci Berry MD to complete medication reconciliation and review with the clinical pharmacist.  A comprehensive medication review was completed with the patient via telephone today.  Patient reports financial barrier with current medication.      MEDICATION HISTORY  Allergies[1]  Medications Ordered Prior to Encounter[2]    Blood sugar monitoring  Karis 2  Uses phone  Fills through pharmacy - not covered by insurance since patient is not on insulin  Fasting sugars around 160  Sugars after meals 250-300        LABS  There were no vitals taken for this visit.   Lab Results   Component Value Date    HGBA1C 9.7 (H) 03/31/2025    HGBA1C 8.6 (A) 10/02/2024    HGBA1C 9.2 (H) 05/03/2024     Lab Results   Component Value Date    BILITOT 0.3 03/31/2025    CALCIUM 10.1 03/31/2025    CO2 28 03/31/2025     03/31/2025    CREATININE 0.77 03/31/2025    GLUCOSE 181 (H) 03/31/2025    ALKPHOS 72 03/31/2025    K 4.2 03/31/2025    PROT 7.3 03/31/2025     03/31/2025    AST 18 03/31/2025    ALT 22 03/31/2025    BUN 16 03/31/2025    ANIONGAP 10 03/31/2025    ALBUMIN 4.4 03/31/2025     Lab Results   Component Value Date    TRIG 341 (H) 03/31/2025    CHOL 178 03/31/2025    LDLCALC 95 03/31/2025    HDL 38 (L) 03/31/2025         Assessment/Plan    Comments/Recommendations to PCP:    Discussed to continue taking metformin  mg four times daily for T2DM  -can take one tablet with breakfast, one tablet with lunch, and two tablets with dinner to help with missing doses    Continue using Karis 3 sensors and utilize the readings to help make adjustments on diet and lifestyle, patient has been doing a great job at this, encouraged to keep up the good work. Discussed the long term effects of elevated blood sugar and the need to bring the sugars down to make sure they aren't damaging her body.    Patient is interested in starting a magnesium  supplement, discussed different options like magnesium oxide or magnesium glycinate that can be purchased over the counter at the pharmacy. Patient will further discus with her PCP.     Discussed that her blood sugar goals per her endocrinologist are:  Fasting , before meals 100-130, and bedtime under 150     Provided medication counseling and answered patient's medication questions.     Follow up with pharmacy: prn  Follow up with PCP: not scheduled but patient will schedule    Sergey Ward, PharmD, BCPS    Verbal consent to manage patient's drug therapy was obtained from the patient and/or an individual authorized to act on behalf of a patient. They were informed they may decline to participate or withdraw from participation in pharmacy services at any time.         [1]   Allergies  Allergen Reactions    Aspirin Other    Fentanyl Other    Nateglinide Other    Phenobarbital Unknown    Zyrtec [Cetirizine] Nausea/vomiting   [2]   Current Outpatient Medications on File Prior to Visit   Medication Sig Dispense Refill    albuterol 90 mcg/actuation inhaler Inhale 1 puff every 4 hours if needed for wheezing or shortness of breath. 8.5 g 3    blood-glucose sensor (FreeStyle Karis 3 Plus Sensor) device Change sensor device every 14 days per package instructions. 6 each 1    calcium polycarbophiL (FiberCon) 625 mg tablet Take 1 tablet (625 mg) by mouth once daily as needed.      CHOLECALCIFEROL, VITAMIN D3, ORAL Take 1 capsule by mouth once daily.      Dexcom G7 Sensor device Change sensor every 10 days per package instructions 9 each 0    ethosuximide (Zarontin) 250 mg capsule Take 2 capsules (500 mg) by mouth once daily at bedtime.      hydrocortisone 2.5 % ointment Apply topically 2 times a day as needed for irritation or rash. 30 g 0    ibuprofen 600 mg tablet TAKE 1 TABLET (600 MG) BY MOUTH 3 TIMES A DAY AS NEEDED FOR MILD PAIN (1 - 3) (PAIN). 60 tablet 0    loratadine (CLARITIN ORAL) Take 1 tablet by mouth  once daily.      losartan (Cozaar) 50 mg tablet Take 1.5 tablets (75 mg) by mouth once daily. Take 1 and 1/2 (one and one-half) tablets by mouth daily. 135 tablet 1    lovastatin (Mevacor) 10 mg tablet TAKE 1 TABLET BY MOUTH EVERY DAY WITH EVENING MEAL 90 tablet 1    lovastatin (Mevacor) 20 mg tablet TAKE 1 TABLET BY MOUTH EVERY DAY WITH A MEAL 90 tablet 1    metFORMIN  mg 24 hr tablet TAKE 1 TABLET BY MOUTH FOUR TIMES A  tablet 2    zinc gluconate 50 mg elemental tablet Take 1 tablet by mouth once daily.      [DISCONTINUED] cetirizine (ZyrTEC) 10 mg tablet TAKE 1 TABLET BY MOUTH EVERY DAY 90 tablet 1    [DISCONTINUED] ibuprofen 400 mg tablet Take 1 tablet (400 mg) by mouth 3 times daily (morning, midday, late afternoon). Or milk      [DISCONTINUED] ibuprofen 600 mg tablet TAKE 1 TABLET BY MOUTH THREE TIMES A DAY WITH FOOD OR MILK AS NEEDED FOR 30 DAYS 90 tablet 0     No current facility-administered medications on file prior to visit.

## 2025-06-13 RX ORDER — BLOOD-GLUCOSE SENSOR
EACH MISCELLANEOUS
Qty: 6 EACH | Refills: 0 | Status: SHIPPED | OUTPATIENT
Start: 2025-06-13

## 2025-06-13 RX ORDER — BLOOD-GLUCOSE SENSOR
EACH MISCELLANEOUS
Qty: 6 EACH | Refills: 1 | Status: SHIPPED | OUTPATIENT
Start: 2025-06-13

## 2025-10-07 ENCOUNTER — APPOINTMENT (OUTPATIENT)
Dept: PRIMARY CARE | Facility: CLINIC | Age: 65
End: 2025-10-07
Payer: COMMERCIAL